# Patient Record
Sex: MALE | Race: WHITE | Employment: OTHER | URBAN - METROPOLITAN AREA
[De-identification: names, ages, dates, MRNs, and addresses within clinical notes are randomized per-mention and may not be internally consistent; named-entity substitution may affect disease eponyms.]

---

## 2017-02-16 PROBLEM — R42 DIZZINESS: Status: ACTIVE | Noted: 2017-02-16

## 2018-07-10 ENCOUNTER — TELEPHONE (OUTPATIENT)
Dept: NON INVASIVE DIAGNOSTICS | Age: 78
End: 2018-07-10

## 2018-07-18 NOTE — PROGRESS NOTES
Cardiac Electrophysiology Progress Note    Aba Oro  1940  Date of Service: 7/18/18   PCP: Cynthia Joshi MD  Electrophysiologist: Kendra Esposito DO    Reason for Consultation: Recurrent dizziness    SUBJECTIVE: Aba Oro is a [de-identified] y.o., male who I am seeing today for recurrent dizziness. Since his last visit Mr. Maverick Silver had a TTE that showed a EF of 60% and a HM that showed predominately NSR w/ 1 PVC. He states he feels ***. He currently denies any chest pain, palpitations, SOB, orthopnea or PND.     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    Focal seizures ([address] 09/19/2013    Tremors of nervous system 09/16/2013       Past Medical History:   Diagnosis Date    Cataract     both eyes; corrective surgery    CHF (congestive heart failure) ([address]     Depression     Detached retina     x 3    Dizziness     Drusen, retina 2005    Both eyes, right x 2, left once    Glaucoma     Gout     Hearing loss of both ears     Lung cancer ([address] 1/29/10     right upper lung; neck    Macular degeneration     Macular hole 8/2014    left    Memory loss     Osteoarthritis     Prostate enlargement     [facility])     Thyroid nodule     removed    Tremors of nervous system     Dr. Eleuterio Haddad       Past Surgical History:   Procedure Laterality Date    CARDIAC CATHETERIZATION  1987,     CATARACT REMOVAL WITH IMPLANT Bilateral 2003    COLONOSCOPY      EYE SURGERY  2006    KNEE ARTHROSCOPY Right 1959    KNEE SURGERY Left 1997    [facility]    [facility]    TONSILLECTOMY  1941    VASECTOMY         Family History   Problem Relation Age of Onset    Heart Disease Mother     Heart Attack Mother        Social History   Substance Use Topics    Smoking status: Former Smoker     Years: 47.00     Types: Cigarettes     Quit date: 8/27/2007    Smokeless tobacco: Never Used   [facility] Alcohol use No       Current Outpatient Prescriptions   Medication Sig Dispense Refill    levothyroxine (SYNTHROID) 75 MCG tablet Take 75 mcg by mouth      ferrous gluconate (FERGON) 240 (27 Fe) MG tablet Take 240 mg by mouth daily       vitamin C (ASCORBIC ACID) 500 MG tablet Take 500 mg by mouth daily      Cholecalciferol (VITAMIN D3) 2000 units CAPS Take by mouth daily      docusate sodium (RA COL-RITE) 100 MG capsule Take 100 mg by mouth daily      vitamin B-12 (CYANOCOBALAMIN) 100 MCG tablet Take 500 mcg by mouth daily      Multiple Vitamins-Minerals (THERAPEUTIC MULTIVITAMIN-MINERALS) tablet Take 1 tablet by mouth daily. Last dose 8/26/2014       No current facility-administered medications for this visit. No Known Allergies    ROS:   Constitutional: Negative for fever, activity change and appetite change. HENT: Negative for epistaxis, difficulty swallowing. Eyes: Negative for blurred vision or double vision. Respiratory: Negative for cough, chest tightness, shortness of breath and wheezing. Cardiovascular: Negative for chest pain, palpitations and leg swelling. Gastrointestinal: Negative for abdominal pain, heartburn, or blood in stool. Genitourinary: Negative for hematuria, burning or frequency. Musculoskeletal: Negative for myalgias, stiffness, or swelling. Skin: Negative for rash, pain, or lumps. Neurological: + recurrent dizziness, no overt syncope. Psychiatric/Behavioral: Negative for confusion and agitation. The patient is not nervous/anxious. PHYSICAL EXAM:  There were no vitals filed for this visit. Constitutional: Oriented to person, place, and time. Well-developed and cooperative. Head: Normocephalic and atraumatic. Eyes: Conjunctivae are normal. Pupils are equal, round, and reactive to light. Neck: No hepatojugular reflux and no JVD present. Carotid bruit is not present. Cardiovascular: S1 normal, S2 normal and intact distal pulses.  A regular rhythm    RME   Number      Account #           [de-identified]      Procedure Date       11/14/2017      Corporate ID                        Ordering Physician   Santiago Mccarthy DO      Accession Number    [id number]       Referring Physician Cynthia Joshi      Date of Birth       1940      Sonographer          Brayan Avila MARLON      Age                 76 year(s)      Interpreting         Valentin Arrington MD                                       Physician                                          Any Other     Procedure    Type of Study      TTE procedure:Echo Complete W/ Dop & Color Flow.     Procedure Date  Date: 11/14/2017 Start: 11:01 AM    Study Location: Echo Lab  Technical Quality: Poor visualization due to lung interference. Indications:Dizziness. Patient Status: Routine    Height: 73 inches Weight: 218 pounds BSA: 2.23 m^2 BMI: 28.76 kg/m^2    HR: 54 bpm     Findings      Left Ventricle   Left ventricular size is grossly normal.   Normal LV segmental wall motion.   Ejection fraction is visually estimated at 60%.   Indeterminate diastolic function.      Right Ventricle   Normal right ventricular size and function.      Left Atrium   Normal sized left atrium.      Right Atrium   Normal right atrium size.      Mitral Valve   No evidence of mitral valve stenosis.      Tricuspid Valve   Mild tricuspid regurgitation. RVSP is 16 mmHg.      Aortic Valve   Mild aortic stenosis is present.      Pulmonic Valve   Not well visualized.  Normal Doppler evaluation.      Pericardial Effusion  Lindalee Melanie is a trivial pericardial effusion noted.      Aorta   Aortic root dimension within normal limits.   Miscellaneous   Inferior Vena Cava not well visualized.      Conclusions      Summary   Technically difficult examination.   Left ventricular size is grossly normal.   Normal LV segmental wall motion.   Ejection fraction is visually estimated at

## 2018-07-19 ENCOUNTER — OFFICE VISIT (OUTPATIENT)
Dept: NON INVASIVE DIAGNOSTICS | Age: 78
End: 2018-07-19
Payer: MEDICARE

## 2018-07-19 VITALS
HEART RATE: 63 BPM | SYSTOLIC BLOOD PRESSURE: 110 MMHG | RESPIRATION RATE: 18 BRPM | BODY MASS INDEX: 30.09 KG/M2 | HEIGHT: 73 IN | WEIGHT: 227 LBS | DIASTOLIC BLOOD PRESSURE: 58 MMHG

## 2018-07-19 DIAGNOSIS — I95.1 ORTHOSTATIC HYPOTENSION: Primary | ICD-10-CM

## 2018-07-19 DIAGNOSIS — R42 DIZZINESS: ICD-10-CM

## 2018-07-19 PROCEDURE — 93000 ELECTROCARDIOGRAM COMPLETE: CPT | Performed by: INTERNAL MEDICINE

## 2018-07-19 PROCEDURE — 99214 OFFICE O/P EST MOD 30 MIN: CPT | Performed by: INTERNAL MEDICINE

## 2018-07-19 NOTE — PROGRESS NOTES
Cardiac Electrophysiology Office Progress Note    Kady Medina  1940  Date of Service: 7/19/18  PCP: Akbar Kim MD  Electrophysiologist: Rose Mary Fang DO    SUBJECTIVE: Kady Medina is a [de-identified] yo male who I was asked to see in Cardiac Electrophysiology follow-up for recurrent dizziness. Mr. Freddie Cuellar is known to me from a tilt table test that I performed in February 2017 for the same symptoms. The tilt table test was normal.  He also was evaluation with a cardiac event monitor which was unremarkable. He has the past medical history as noted below. He states that he continues to have recurrent episodes of dizziness in any position. Now, however, more with change in position. He denies any overt syncope. He denies any associated symptoms of nausea, vomiting or diaphoresis. He also denies any palpitations preceding the event. He currently denies any chest pain, SOB, orthopnea or PND. He does admit to not hydrating well and he continues to drink coffee and Peps[employer] on a regular basis. His orthostatic BP's are positive today.     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    Focal seizures ([address] 09/19/2013    Tremors of nervous system 09/16/2013       Past Medical History:   Diagnosis Date    Cataract     both eyes; corrective surgery    CHF (congestive heart failure) ([address]     Depression     Detached retina     x 3    Dizziness     Drusen, retina 2005    Both eyes, right x 2, left once    Glaucoma     Gout     Hearing loss of both ears     Lung cancer ([address] 1/29/10     right upper lung; neck    Macular degeneration     Macular hole 8/2014    left    Memory loss     Osteoarthritis     Prostate enlargement     Skin cancer 07/17/2018    left temple area    Stroke [facility])     Thyroid nodule     removed    Tremors of nervous system     Dr. Pily Bernal       Past Surgical History: Procedure Laterality Date    CARDIAC CATHETERIZATION  1987,     CATARACT REMOVAL WITH IMPLANT Bilateral 2003    COLONOSCOPY      EYE SURGERY  2006    KNEE ARTHROSCOPY Right 1959    KNEE SURGERY Left 1997    [facility]10    [facility]    SKIN CANCER EXCISION Left     left temple area- skin cancer removal    TONSILLECTOMY  1941    TOOTH EXTRACTION      teeth pulled    VASECTOMY         Family History   Problem Relation Age of Onset    Heart Disease Mother     Heart Attack Mother        Social History   Substance Use Topics    Smoking status: Former Smoker     Years: 47.00     Types: Cigarettes     Quit date: 8/27/2007    Smokeless tobacco: Never Used    Alcohol use No       Current Outpatient Prescriptions   Medication Sig Dispense Refill    levothyroxine (SYNTHROID) 75 MCG tablet Take 75 mcg by mouth      ferrous gluconate (FERGON) 240 (27 Fe) MG tablet Take 240 mg by mouth daily       vitamin C (ASCORBIC ACID) 500 MG tablet Take 500 mg by mouth daily      Cholecalciferol (VITAMIN D3) 2000 units CAPS Take by mouth daily      docusate sodium (RA COL-RITE) 100 MG capsule Take 100 mg by mouth as needed       vitamin B-12 (CYANOCOBALAMIN) 100 MCG tablet Take 500 mcg by mouth daily      Multiple Vitamins-Minerals (THERAPEUTIC MULTIVITAMIN-MINERALS) tablet Take 1 tablet by mouth daily. Last dose 8/26/2014       No current facility-administered medications for this visit. No Known Allergies    ROS:   Constitutional: Negative for fever, activity change and appetite change. HENT: Negative for epistaxis, difficulty swallowing. Eyes: Negative for blurred vision or double vision. Respiratory: Negative for cough, chest tightness, shortness of breath and wheezing. Cardiovascular: Negative for chest pain, palpitations and leg swelling. Gastrointestinal: Negative for abdominal pain, heartburn, or blood in stool. Genitourinary: Negative for hematuria, burning or frequency. Musculoskeletal: Negative for myalgias, stiffness, or swelling. Skin: Negative for rash, pain, or lumps. Neurological: + recurrent dizziness, no overt syncope. Psychiatric/Behavioral: Negative for confusion and agitation. The patient is not nervous/anxious. PHYSICAL EXAM:  Vitals:    07/19/18 0942 07/19/18 0946 07/19/18 0948   BP: (!) 140/70 108/60 (!) 110/58   Site: Right Arm Right Arm Right Arm   Position: Supine Sitting Standing   Cuff Size: Large Adult Large Adult Large Adult   Pulse: 63     Resp: 18     Weight: 227 lb (103 kg)     Height: 6' 1\" (1.854 m)       Constitutional: Oriented to person, place, and time. Well-developed and cooperative. Head: Normocephalic and atraumatic. Eyes: Conjunctivae are normal. Pupils are equal, round, and reactive to light. Neck: No hepatojugular reflux and no JVD present. Carotid bruit is not present. Cardiovascular: S1 normal, S2 normal and intact distal pulses. A regular rhythm present. PMI is not displaced. Pulmonary/Chest: Effort normal and breath sounds normal. No respiratory distress. Abdominal: Soft. Normal appearance and bowel sounds are normal.  No abdominal bruit and no pulsatile midline mass. There is no hepatomegaly. No tenderness. Musculoskeletal: Normal range of motion of all extremities, no muscle weakness. Neurological: Alert and oriented to person, place, and time. Skin: Skin is warm and dry. No bruising, no ecchymosis and no rash noted. Extremity: No clubbing or cyanosis. No edema. Psychiatric: Normal mood and affect. Thought content normal.     Pertinent Labs:  CBC: No results for input(s): WBC, HGB, HCT, PLT in the last 72 hours. BMP:No results for input(s): NA, K, CL, CO2, BUN, CREATININE, CALCIUM in the last 72 hours. Invalid input(s): GLU  ABGs: No results found for: PHART, PO2ART, OFD6UEK  INR: No results for input(s): INR in the last 72 hours. BNP: No results for input(s): BNP in the last 72 hours.    TSH:   Lab Results   Component Value Date    TSH 1.440 05/16/2016      Cardiac Injury Profile: No results for input(s): CKTOTAL, CKMB, CKMBINDEX, TROPONINI in the last 72 hours. Lipid Profile:   Lab Results   Component Value Date    TRIG 52 05/16/2016    HDL 38 05/16/2016    LDLCALC 57 05/16/2016    CHOL 105 05/16/2016      Hemoglobin A1C: No components found for: HGBA1C   Xray:   No orders to display     ECG 7/19/18: Sinus rhythm with a 1st degree AVD, LAFB, PRWP, No acute ST changes    I have independently reviewed all of the ECGs as per above. I have reviewed all progress notes & records from the time of the patient's last office visit up to present. Impression:    1. Dizziness/Orthostatic hypotension  - normal TTT--February 2017  - no overt syncope  - inadequate hydration with caffeine use. 2. H/O vertebrobasilar TIA    3. 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 Hypothyroidism  - Synthroid replacement  Lab Results   Component Value Date    TSH 1.440 05/16/2016    T4FREE 1.62 05/16/2016     Recommendations:    1. Mr. Raphael Monsalve presents with orthostatic hypotension(OH) with associated dizziness with change in position. He does not hydrate well through the day and continues to drink caffeine on a daily basis. I have recommended:  A) Increase hydration: Drink 1-2L of non-caffeinated beverage per day B) Limit caffeine use C) Use support/compression socks during daytime hours. If these recommendations do not help improve his symptoms and he remains orthostatic, consideration can be given to the addition of low dose florinef or midodrine. If one of these medications are used, we would need to follow his baseline BP closely. 2. No changes were made in his medications today.   3. Follow-up in the office in 3 months. I have spent a total of 25 minutes with the patient reviewing the above stated recommendations. A total of >50% of that time involved face-to-face time providing counseling and or coordination of care with the other providers. Thank you for allowing me to participate in your patient's care.     Rose Mary Fang DO  [facility] Cardiac Electrophysiology  [address] [facility]

## 2018-07-19 NOTE — PATIENT INSTRUCTIONS
Orthostatic Hypotension    1. Increase hydration: Drink up to 1-2 L of noncaffeinated beverages per day. 2. Limit caffeine use. 3. Wear compression socks during daytime hours. 4. Office follow-up in 2-3 months. 5. If despite the above, he continues to have continued dizziness and orthostatic hypotension, we will consider the addition of a medication.

## 2018-10-12 ENCOUNTER — TELEPHONE (OUTPATIENT)
Dept: NON INVASIVE DIAGNOSTICS | Age: 78
End: 2018-10-12

## 2018-10-12 NOTE — TELEPHONE ENCOUNTER
Pt calls to state he had cancelled his appt 21 [phone]. He said Dr Michael Silva told him he did not need to f/u. I told him the note was marked to f/u in 3 mo.   I asked him if he would like to make another appt, he declined

## 2019-09-10 ENCOUNTER — OFFICE VISIT (OUTPATIENT)
Dept: CHIROPRACTIC MEDICINE | Age: 79
End: 2019-09-10
Payer: MEDICARE

## 2019-09-10 VITALS — TEMPERATURE: 97 F | HEART RATE: 61 BPM | OXYGEN SATURATION: 94 %

## 2019-09-10 DIAGNOSIS — M99.03 SEGMENTAL AND SOMATIC DYSFUNCTION OF LUMBAR REGION: ICD-10-CM

## 2019-09-10 DIAGNOSIS — M99.05 SEGMENTAL AND SOMATIC DYSFUNCTION OF PELVIC REGION: Primary | ICD-10-CM

## 2019-09-10 DIAGNOSIS — M54.50 ACUTE LEFT-SIDED LOW BACK PAIN WITHOUT SCIATICA: ICD-10-CM

## 2019-09-10 DIAGNOSIS — M53.3 SACROCOCCYGEAL DISORDERS, NOT ELSEWHERE CLASSIFIED: ICD-10-CM

## 2019-09-10 PROCEDURE — 99203 OFFICE O/P NEW LOW 30 MIN: CPT | Performed by: CHIROPRACTOR

## 2019-09-10 PROCEDURE — 98940 CHIROPRACT MANJ 1-2 REGIONS: CPT | Performed by: CHIROPRACTOR

## 2019-09-10 ASSESSMENT — ENCOUNTER SYMPTOMS: BACK PAIN: 1

## 2019-09-16 ENCOUNTER — OFFICE VISIT (OUTPATIENT)
Dept: CHIROPRACTIC MEDICINE | Age: 79
End: 2019-09-16
Payer: MEDICARE

## 2019-09-16 DIAGNOSIS — M54.50 ACUTE LEFT-SIDED LOW BACK PAIN WITHOUT SCIATICA: ICD-10-CM

## 2019-09-16 DIAGNOSIS — M99.05 SEGMENTAL AND SOMATIC DYSFUNCTION OF PELVIC REGION: Primary | ICD-10-CM

## 2019-09-16 DIAGNOSIS — M53.3 SACROCOCCYGEAL DISORDERS, NOT ELSEWHERE CLASSIFIED: ICD-10-CM

## 2019-09-16 DIAGNOSIS — M99.03 SEGMENTAL AND SOMATIC DYSFUNCTION OF LUMBAR REGION: ICD-10-CM

## 2019-09-16 PROCEDURE — 98940 CHIROPRACT MANJ 1-2 REGIONS: CPT | Performed by: CHIROPRACTOR

## 2019-09-16 NOTE — PROGRESS NOTES
19  Sara Ortega : 1940 Sex: male  Age: 78 y.o. Chief Complaint   Patient presents with    Lower Back Pain       HPI:   Pain is has slightly improved. On average, pain is perceived as mild (1-3  pain scale). Change in quality of symptoms: no.  Associated symptoms: none. He denies any other symptoms. Current Outpatient Medications:     levothyroxine (SYNTHROID) 75 MCG tablet, Take 75 mcg by mouth, Disp: , Rfl:     ferrous gluconate (FERGON) 240 (27 Fe) MG tablet, Take 240 mg by mouth daily , Disp: , Rfl:     vitamin C (ASCORBIC ACID) 500 MG tablet, Take 500 mg by mouth daily, Disp: , Rfl:     Cholecalciferol (VITAMIN D3) 2000 units CAPS, Take by mouth daily, Disp: , Rfl:     docusate sodium (RA COL-RITE) 100 MG capsule, Take 100 mg by mouth as needed , Disp: , Rfl:     vitamin B-12 (CYANOCOBALAMIN) 100 MCG tablet, Take 500 mcg by mouth daily, Disp: , Rfl:     Multiple Vitamins-Minerals (THERAPEUTIC MULTIVITAMIN-MINERALS) tablet, Take 1 tablet by mouth daily. Last dose 2014, Disp: , Rfl:     Exam: Tender left SI joint    There are hypertonic and tender fibers noted today in the lateral lumbar paraspinal muscles. Joint fixation is noted with motion screening at L5-S1, bilateral SI joints. Elvin was seen today for lower back pain. Diagnoses and all orders for this visit:    Segmental and somatic dysfunction of pelvic region    Sacrococcygeal disorders, not elsewhere classified    Segmental and somatic dysfunction of lumbar region    Acute left-sided low back pain without sciatica        Treatment Plan: Continued with mechanically assisted manipulation. Waite flexion distraction manipulation as well at L5, protocol 1. Tolerated well.   Follow-up 1 week      Seen By:  Cuco Marie DC

## 2020-01-01 ENCOUNTER — TELEPHONE (OUTPATIENT)
Dept: ADMINISTRATIVE | Age: 80
End: 2020-01-01

## 2020-01-01 LAB
ALBUMIN SERPL-MCNC: NORMAL G/DL
ALP BLD-CCNC: NORMAL U/L
ALT SERPL-CCNC: NORMAL U/L
ANION GAP SERPL CALCULATED.3IONS-SCNC: NORMAL MMOL/L
AST SERPL-CCNC: NORMAL U/L
BILIRUB SERPL-MCNC: NORMAL MG/DL
BUN BLDV-MCNC: NORMAL MG/DL
CALCIUM SERPL-MCNC: NORMAL MG/DL
CHLORIDE BLD-SCNC: NORMAL MMOL/L
CO2: NORMAL
CREAT SERPL-MCNC: NORMAL MG/DL
GFR CALCULATED: NORMAL
GLUCOSE BLD-MCNC: NORMAL MG/DL
HCT VFR BLD CALC: NORMAL %
HEMOGLOBIN: NORMAL
PLATELET # BLD: NORMAL 10*3/UL
POTASSIUM SERPL-SCNC: NORMAL MMOL/L
SODIUM BLD-SCNC: NORMAL MMOL/L
TOTAL PROTEIN: NORMAL
TSH SERPL DL<=0.05 MIU/L-ACNC: NORMAL M[IU]/L
WBC # BLD: NORMAL 10*3/UL

## 2020-07-17 ENCOUNTER — TELEPHONE (OUTPATIENT)
Dept: NON INVASIVE DIAGNOSTICS | Age: 80
End: 2020-07-17

## 2020-07-17 NOTE — TELEPHONE ENCOUNTER
Patient declined virtual video visit and requested a virtual phone call visit. Patient scheduled with [facility] on 08/05/2020. We want to confirm that, for purposes of billing, this is a virtual visit with your provider for which we will submit a claim for reimbursement with your insurance company. You will be responsible for any copays, coinsurance amounts or other amounts not covered by your insurance company. If you do not accept this, unfortunately we will not be able to schedule a virtual visit with the provider. Do you accept? Patient accepted and verbalized understanding.

## 2020-08-06 ENCOUNTER — VIRTUAL VISIT (OUTPATIENT)
Dept: NON INVASIVE DIAGNOSTICS | Age: 80
End: 2020-08-06
Payer: MEDICARE

## 2020-08-06 PROCEDURE — 99443 PR PHYS/QHP TELEPHONE EVALUATION 21-30 MIN: CPT | Performed by: INTERNAL MEDICINE

## 2020-08-06 NOTE — PROGRESS NOTES
[address] [facility]  1940  Date of Service: 8/6/20  PCP: Chata Crowe MD  Electrophysiologist: Evens Higgins DO    7/19/18:  Uriel Elias is a [de-identified] yo male who I was asked to see in Cardiac Electrophysiology follow-up for recurrent dizziness. Mr. Nadine Belle is known to me from a tilt table test that I performed in February 2017 for the same symptoms. The tilt table test was normal.  He also was evaluation with a cardiac event monitor which was unremarkable. He has the past medical history as noted below. He states that he continues to have recurrent episodes of dizziness in any position. Now, however, more with change in position. He denies any overt syncope. He denies any associated symptoms of nausea, vomiting or diaphoresis. He also denies any palpitations preceding the event. He currently denies any chest pain, SOB, orthopnea or PND. He does admit to not hydrating well and he continues to drink coffee and Pepsi on a regular basis. His orthostatic BP's are positive today. 08/06/20--TV: Uriel Elias gives verbal consent for a telephone visit from his home via my office. Since his last OV in 7/2018 he offers no complaint from a cardiac or EP perspective. He denies any near syncope or syncope.     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    Focal seizures ([address] 09/19/2013    Tremors of nervous system 09/16/2013       Past Medical History:   Diagnosis Date    Cataract     both eyes; corrective surgery    CHF (congestive heart failure) (HCC)     Depression     Detached retina     x 3    Dizziness     Drusen, retina 2005    Both eyes, right x 2, left once    Glaucoma     Gout     Hearing loss of both ears     Lung cancer ([address] 1/29/10     right upper lung; neck    Macular degeneration     Macular hole 2014    left    Memory loss     Osteoarthritis     Prostate enlargement     Skin cancer 2018    left temple area    Stroke [facility])     Thyroid nodule     removed    Tremors of nervous system     Dr. Stephanie Matthews       Past Surgical History:   Procedure Laterality Date   [address],     CATARACT REMOVAL WITH IMPLANT Bilateral     COLONOSCOPY      EYE SURGERY  2006    KNEE ARTHROSCOPY Right     KNEE SURGERY Left     [facility]    [facility]    SKIN CANCER EXCISION Left     left temple area- skin cancer removal    TONSILLECTOMY  194    TOOTH EXTRACTION      teeth pulled    VASECTOMY         Family History   Problem Relation Age of Onset    Heart Disease Mother     Heart Attack Mother        Social History     Tobacco Use    Smoking status: Former Smoker     Years: 47.00     Types: Cigarettes     Last attempt to quit: 2007     Years since quittin.9    Smokeless tobacco: Never Used   Substance Use Topics    Alcohol use: No       Current Outpatient Medications   Medication Sig Dispense Refill    levothyroxine (SYNTHROID) 75 MCG tablet Take 75 mcg by mouth      ferrous gluconate (FERGON) 240 (27 Fe) MG tablet Take 240 mg by mouth daily       vitamin C (ASCORBIC ACID) 500 MG tablet Take 500 mg by mouth daily      Cholecalciferol (VITAMIN D3) 2000 units CAPS Take by mouth daily      docusate sodium (RA COL-RITE) 100 MG capsule Take 100 mg by mouth as needed       vitamin B-12 (CYANOCOBALAMIN) 100 MCG tablet Take 500 mcg by mouth daily      Multiple Vitamins-Minerals (THERAPEUTIC MULTIVITAMIN-MINERALS) tablet Take 1 tablet by mouth daily. Last dose 2014       No current facility-administered medications for this visit. No Known Allergies    ROS:   Constitutional: Negative for fever, activity change and appetite change. HENT: Negative for epistaxis, difficulty swallowing.    Eyes: Negative for blurred vision or double vision. Respiratory: Negative for cough, chest tightness, shortness of breath and wheezing. Cardiovascular: Negative for chest pain, palpitations and leg swelling. Gastrointestinal: Negative for abdominal pain, heartburn, or blood in stool. Genitourinary: Negative for hematuria, burning or frequency. Musculoskeletal: Negative for myalgias, stiffness, or swelling. Skin: Negative for rash, pain, or lumps. Psychiatric/Behavioral: Negative for confusion and agitation. The patient is not nervous/anxious. PHYSICAL EXAM:  Constitutional: Oriented to person, place, and time. Psychiatric: Normal mood and affect. Thought content normal.     Pertinent Labs:  CBC: No results for input(s): WBC, HGB, HCT, PLT in the last 72 hours. BMP:No results for input(s): NA, K, CL, CO2, BUN, CREATININE, CALCIUM in the last 72 hours. Invalid input(s): GLU  ABGs: No results found for: PHART, PO2ART, MYL5WPT  INR: No results for input(s): INR in the last 72 hours. BNP: No results for input(s): BNP in the last 72 hours. TSH:   Lab Results   Component Value Date    TSH 1.440 05/16/2016      Cardiac Injury Profile: No results for input(s): CKTOTAL, CKMB, CKMBINDEX, TROPONINI in the last 72 hours. Lipid Profile:   Lab Results   Component Value Date    TRIG 52 05/16/2016    HDL 38 05/16/2016    LDLCALC 57 05/16/2016    CHOL 105 05/16/2016      Hemoglobin A1C: No components found for: HGBA1C   Xray:   No orders to display     ECG 7/19/18: Sinus rhythm with a 1st degree AVD, LAFB, PRWP, No acute ST changes    I have independently reviewed all of the ECGs as per above. I have reviewed all progress notes & records from the time of the patient's last office visit up to present. Impression:    1. Dizziness/Orthostatic hypotension  - normal TTT--February 2017  - no overt syncope  - inadequate hydration with caffeine use. 2. H/O vertebrobasilar TIA    3.  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 Hypothyroidism  - Synthroid replacement  Lab Results   Component Value Date    TSH 1.440 05/16/2016    T4FREE 1.62 05/16/2016     Recommendations:    1. No changers were made in his medications. 2. Office follow-up as needed. I have spent a total of 25 minutes with the patient via a telephone visit reviewing the above stated recommendations. A total of >50% of that time involved in providing counseling and or coordination of care with the other providers. Thank you for allowing me to participate in your patient's care. Michel Toro DO  [facility] Cardiac Electrophysiology  [facility]    Due to this being a TeleHealth encounter, evaluation of organ systems is limited. Pursuant to the emergency declaration under the [facility], [facility] waiver authority and the [employer] General Act, this telephone visit was conducted, with patient's consent, to reduce the patient's risk of exposure to COVID-19 and provide continuity of care for an established patient. Services were provided through a telephone discussion to substitute for in-person clinic visit.

## 2020-12-01 NOTE — TELEPHONE ENCOUNTER
Pt's son calling at the request of his PCP Dr. Patsy Guaman - to schedule an OV with Dr. Zoë Murray - pt is c/o of dizziness for months now. Please return pt call on his home number re; apt/recommendations.

## 2021-01-01 ENCOUNTER — NURSE ONLY (OUTPATIENT)
Dept: FAMILY MEDICINE CLINIC | Age: 81
End: 2021-01-01
Payer: MEDICARE

## 2021-01-01 ENCOUNTER — OUTSIDE SERVICES (OUTPATIENT)
Dept: FAMILY MEDICINE CLINIC | Age: 81
End: 2021-01-01
Payer: MEDICARE

## 2021-01-01 ENCOUNTER — TELEPHONE (OUTPATIENT)
Dept: ADMINISTRATIVE | Age: 81
End: 2021-01-01

## 2021-01-01 VITALS
RESPIRATION RATE: 16 BRPM | HEART RATE: 57 BPM | SYSTOLIC BLOOD PRESSURE: 100 MMHG | TEMPERATURE: 95.1 F | DIASTOLIC BLOOD PRESSURE: 60 MMHG | OXYGEN SATURATION: 95 %

## 2021-01-01 VITALS
DIASTOLIC BLOOD PRESSURE: 60 MMHG | HEART RATE: 60 BPM | OXYGEN SATURATION: 96 % | TEMPERATURE: 95.8 F | SYSTOLIC BLOOD PRESSURE: 90 MMHG | RESPIRATION RATE: 16 BRPM

## 2021-01-01 VITALS
RESPIRATION RATE: 16 BRPM | TEMPERATURE: 95.5 F | OXYGEN SATURATION: 91 % | DIASTOLIC BLOOD PRESSURE: 70 MMHG | HEART RATE: 53 BPM | SYSTOLIC BLOOD PRESSURE: 130 MMHG

## 2021-01-01 VITALS — TEMPERATURE: 96.8 F

## 2021-01-01 DIAGNOSIS — Z85.118 HISTORY OF LUNG CANCER: ICD-10-CM

## 2021-01-01 DIAGNOSIS — R53.83 FATIGUE, UNSPECIFIED TYPE: ICD-10-CM

## 2021-01-01 DIAGNOSIS — E63.9 POOR NUTRITION: ICD-10-CM

## 2021-01-01 DIAGNOSIS — Z91.81 HISTORY OF FALL: ICD-10-CM

## 2021-01-01 DIAGNOSIS — R56.9 FOCAL SEIZURES (HCC): ICD-10-CM

## 2021-01-01 DIAGNOSIS — Z97.8 USES FEEDING TUBE: ICD-10-CM

## 2021-01-01 DIAGNOSIS — R42 DIZZINESS: ICD-10-CM

## 2021-01-01 DIAGNOSIS — D64.9 ANEMIA, UNSPECIFIED TYPE: ICD-10-CM

## 2021-01-01 DIAGNOSIS — I67.9 CEREBROVASCULAR DISEASE: ICD-10-CM

## 2021-01-01 DIAGNOSIS — I67.2 CEREBRAL ATHEROSCLEROSIS: ICD-10-CM

## 2021-01-01 DIAGNOSIS — I67.2 CEREBRAL ATHEROSCLEROSIS: Primary | ICD-10-CM

## 2021-01-01 DIAGNOSIS — E55.9 VITAMIN D DEFICIENCY: ICD-10-CM

## 2021-01-01 DIAGNOSIS — G45.0 VERTEBROBASILAR TIAS: ICD-10-CM

## 2021-01-01 DIAGNOSIS — I95.9 HYPOTENSION, UNSPECIFIED HYPOTENSION TYPE: Primary | ICD-10-CM

## 2021-01-01 DIAGNOSIS — Z12.5 SCREENING FOR PROSTATE CANCER: ICD-10-CM

## 2021-01-01 DIAGNOSIS — Z00.8 ENCOUNTER FOR OTHER GENERAL EXAMINATION: Primary | ICD-10-CM

## 2021-01-01 DIAGNOSIS — R13.10 DYSPHAGIA, UNSPECIFIED TYPE: Primary | ICD-10-CM

## 2021-01-01 DIAGNOSIS — I95.9 HYPOTENSION, UNSPECIFIED HYPOTENSION TYPE: ICD-10-CM

## 2021-01-01 DIAGNOSIS — R25.1 TREMORS OF NERVOUS SYSTEM: ICD-10-CM

## 2021-01-01 LAB
ALBUMIN SERPL-MCNC: 2.7 G/DL (ref 3.5–5.2)
ALBUMIN SERPL-MCNC: 2.7 G/DL (ref 3.5–5.2)
ALBUMIN SERPL-MCNC: 3.7 G/DL (ref 3.5–5.2)
ALP BLD-CCNC: 75 U/L (ref 40–129)
ALP BLD-CCNC: 78 U/L (ref 40–129)
ALP BLD-CCNC: 91 U/L (ref 40–129)
ALT SERPL-CCNC: 7 U/L (ref 0–40)
ALT SERPL-CCNC: 7 U/L (ref 0–40)
ALT SERPL-CCNC: <5 U/L (ref 0–40)
AMMONIA: 29 UMOL/L (ref 16–60)
ANION GAP SERPL CALCULATED.3IONS-SCNC: 10 MMOL/L (ref 7–16)
ANION GAP SERPL CALCULATED.3IONS-SCNC: 10 MMOL/L (ref 7–16)
ANION GAP SERPL CALCULATED.3IONS-SCNC: 14 MMOL/L (ref 7–16)
ANION GAP SERPL CALCULATED.3IONS-SCNC: 14 MMOL/L (ref 7–16)
ANION GAP SERPL CALCULATED.3IONS-SCNC: 3 MMOL/L (ref 7–16)
ANION GAP SERPL CALCULATED.3IONS-SCNC: 5 MMOL/L (ref 7–16)
ANION GAP SERPL CALCULATED.3IONS-SCNC: 6 MMOL/L (ref 7–16)
ANION GAP SERPL CALCULATED.3IONS-SCNC: 7 MMOL/L (ref 7–16)
ANION GAP SERPL CALCULATED.3IONS-SCNC: 7 MMOL/L (ref 7–16)
ANISOCYTOSIS: ABNORMAL
AST SERPL-CCNC: 18 U/L (ref 0–39)
AST SERPL-CCNC: 23 U/L (ref 0–39)
AST SERPL-CCNC: 25 U/L (ref 0–39)
BASOPHILS ABSOLUTE: 0 E9/L (ref 0–0.2)
BASOPHILS ABSOLUTE: 0.04 E9/L (ref 0–0.2)
BASOPHILS ABSOLUTE: 0.05 E9/L (ref 0–0.2)
BASOPHILS ABSOLUTE: 0.05 E9/L (ref 0–0.2)
BASOPHILS RELATIVE PERCENT: 0.4 % (ref 0–2)
BASOPHILS RELATIVE PERCENT: 0.6 % (ref 0–2)
BASOPHILS RELATIVE PERCENT: 0.7 % (ref 0–2)
BASOPHILS RELATIVE PERCENT: 0.7 % (ref 0–2)
BILIRUB SERPL-MCNC: 0.2 MG/DL (ref 0–1.2)
BILIRUB SERPL-MCNC: 0.2 MG/DL (ref 0–1.2)
BILIRUB SERPL-MCNC: 0.3 MG/DL (ref 0–1.2)
BUN BLDV-MCNC: 30 MG/DL (ref 6–23)
BUN BLDV-MCNC: 34 MG/DL (ref 6–23)
BUN BLDV-MCNC: 37 MG/DL (ref 6–23)
BUN BLDV-MCNC: 38 MG/DL (ref 8–23)
BUN BLDV-MCNC: 42 MG/DL (ref 6–23)
BUN BLDV-MCNC: 43 MG/DL (ref 6–23)
BUN BLDV-MCNC: 53 MG/DL (ref 6–23)
BUN BLDV-MCNC: 67 MG/DL (ref 6–23)
BUN BLDV-MCNC: 68 MG/DL (ref 6–23)
CALCIUM SERPL-MCNC: 8.6 MG/DL (ref 8.6–10.2)
CALCIUM SERPL-MCNC: 8.7 MG/DL (ref 8.6–10.2)
CALCIUM SERPL-MCNC: 8.7 MG/DL (ref 8.6–10.2)
CALCIUM SERPL-MCNC: 8.9 MG/DL (ref 8.6–10.2)
CALCIUM SERPL-MCNC: 8.9 MG/DL (ref 8.6–10.2)
CALCIUM SERPL-MCNC: 9 MG/DL (ref 8.6–10.2)
CALCIUM SERPL-MCNC: 9 MG/DL (ref 8.6–10.2)
CALCIUM SERPL-MCNC: 9.1 MG/DL (ref 8.6–10.2)
CALCIUM SERPL-MCNC: 9.5 MG/DL (ref 8.6–10.2)
CHLORIDE BLD-SCNC: 100 MMOL/L (ref 98–107)
CHLORIDE BLD-SCNC: 90 MMOL/L (ref 98–107)
CHLORIDE BLD-SCNC: 92 MMOL/L (ref 98–107)
CHLORIDE BLD-SCNC: 94 MMOL/L (ref 98–107)
CHLORIDE BLD-SCNC: 94 MMOL/L (ref 98–107)
CHLORIDE BLD-SCNC: 95 MMOL/L (ref 98–107)
CHLORIDE BLD-SCNC: 97 MMOL/L (ref 98–107)
CHLORIDE BLD-SCNC: 97 MMOL/L (ref 98–107)
CHLORIDE BLD-SCNC: 98 MMOL/L (ref 98–107)
CHOLESTEROL, TOTAL: 135 MG/DL (ref 0–199)
CO2: 19 MMOL/L (ref 22–29)
CO2: 26 MMOL/L (ref 22–29)
CO2: 29 MMOL/L (ref 22–29)
CO2: 30 MMOL/L (ref 22–29)
CO2: 30 MMOL/L (ref 22–29)
CO2: 34 MMOL/L (ref 22–29)
CO2: 35 MMOL/L (ref 22–29)
CO2: 36 MMOL/L (ref 22–29)
CO2: 36 MMOL/L (ref 22–29)
CREAT SERPL-MCNC: 1 MG/DL (ref 0.7–1.2)
CREAT SERPL-MCNC: 1.1 MG/DL (ref 0.7–1.2)
CREAT SERPL-MCNC: 1.4 MG/DL (ref 0.7–1.2)
CREAT SERPL-MCNC: 1.5 MG/DL (ref 0.7–1.2)
CREAT SERPL-MCNC: 1.5 MG/DL (ref 0.7–1.2)
EOSINOPHILS ABSOLUTE: 0.12 E9/L (ref 0.05–0.5)
EOSINOPHILS ABSOLUTE: 0.23 E9/L (ref 0.05–0.5)
EOSINOPHILS ABSOLUTE: 0.24 E9/L (ref 0.05–0.5)
EOSINOPHILS ABSOLUTE: 0.36 E9/L (ref 0.05–0.5)
EOSINOPHILS RELATIVE PERCENT: 0.9 % (ref 0–6)
EOSINOPHILS RELATIVE PERCENT: 3.3 % (ref 0–6)
EOSINOPHILS RELATIVE PERCENT: 4.2 % (ref 0–6)
EOSINOPHILS RELATIVE PERCENT: 4.5 % (ref 0–6)
FERRITIN: 349 NG/ML
FOLATE: 9.8 NG/ML (ref 4.8–24.2)
GFR AFRICAN AMERICAN: 54
GFR AFRICAN AMERICAN: 54
GFR AFRICAN AMERICAN: 58
GFR AFRICAN AMERICAN: >60
GFR NON-AFRICAN AMERICAN: 45 ML/MIN/1.73
GFR NON-AFRICAN AMERICAN: 45 ML/MIN/1.73
GFR NON-AFRICAN AMERICAN: 48 ML/MIN/1.73
GFR NON-AFRICAN AMERICAN: >60 ML/MIN/1.73
GLUCOSE BLD-MCNC: 102 MG/DL (ref 74–99)
GLUCOSE BLD-MCNC: 103 MG/DL (ref 74–99)
GLUCOSE BLD-MCNC: 107 MG/DL (ref 74–99)
GLUCOSE BLD-MCNC: 109 MG/DL (ref 74–99)
GLUCOSE BLD-MCNC: 49 MG/DL (ref 74–99)
GLUCOSE BLD-MCNC: 57 MG/DL (ref 74–99)
GLUCOSE BLD-MCNC: 79 MG/DL (ref 74–99)
GLUCOSE BLD-MCNC: 82 MG/DL (ref 74–99)
GLUCOSE BLD-MCNC: 85 MG/DL (ref 74–99)
HCT VFR BLD CALC: 24.6 % (ref 37–54)
HCT VFR BLD CALC: 25.8 % (ref 37–54)
HCT VFR BLD CALC: 33.9 % (ref 37–54)
HCT VFR BLD CALC: 35 % (ref 37–54)
HCT VFR BLD CALC: 39.1 % (ref 37–54)
HDLC SERPL-MCNC: 53 MG/DL
HEMOGLOBIN: 10.6 G/DL (ref 12.5–16.5)
HEMOGLOBIN: 10.8 G/DL (ref 12.5–16.5)
HEMOGLOBIN: 11.6 G/DL (ref 12.5–16.5)
HEMOGLOBIN: 7.7 G/DL (ref 12.5–16.5)
HEMOGLOBIN: 8.1 G/DL (ref 12.5–16.5)
HYPOCHROMIA: ABNORMAL
IMMATURE GRANULOCYTES #: 0.01 E9/L
IMMATURE GRANULOCYTES #: 0.13 E9/L
IMMATURE GRANULOCYTES #: 0.14 E9/L
IMMATURE GRANULOCYTES %: 0.2 % (ref 0–5)
IMMATURE GRANULOCYTES %: 1.7 % (ref 0–5)
IMMATURE GRANULOCYTES %: 1.8 % (ref 0–5)
IRON SATURATION: 22 % (ref 20–55)
IRON: 50 MCG/DL (ref 59–158)
IRON: 50 MCG/DL (ref 59–158)
LDL CHOLESTEROL CALCULATED: 69 MG/DL (ref 0–99)
LYMPHOCYTES ABSOLUTE: 0.4 E9/L (ref 1.5–4)
LYMPHOCYTES ABSOLUTE: 0.74 E9/L (ref 1.5–4)
LYMPHOCYTES ABSOLUTE: 0.76 E9/L (ref 1.5–4)
LYMPHOCYTES ABSOLUTE: 0.81 E9/L (ref 1.5–4)
LYMPHOCYTES RELATIVE PERCENT: 10.2 % (ref 20–42)
LYMPHOCYTES RELATIVE PERCENT: 14.9 % (ref 20–42)
LYMPHOCYTES RELATIVE PERCENT: 2.6 % (ref 20–42)
LYMPHOCYTES RELATIVE PERCENT: 9.4 % (ref 20–42)
MCH RBC QN AUTO: 30.4 PG (ref 26–35)
MCH RBC QN AUTO: 30.5 PG (ref 26–35)
MCH RBC QN AUTO: 31.2 PG (ref 26–35)
MCH RBC QN AUTO: 31.2 PG (ref 26–35)
MCHC RBC AUTO-ENTMCNC: 29.7 % (ref 32–34.5)
MCHC RBC AUTO-ENTMCNC: 30.9 % (ref 32–34.5)
MCHC RBC AUTO-ENTMCNC: 31.3 % (ref 32–34.5)
MCHC RBC AUTO-ENTMCNC: 31.4 % (ref 32–34.5)
MCV RBC AUTO: 102.9 FL (ref 80–99.9)
MCV RBC AUTO: 98.6 FL (ref 80–99.9)
MCV RBC AUTO: 99.2 FL (ref 80–99.9)
MCV RBC AUTO: 99.7 FL (ref 80–99.9)
MONOCYTES ABSOLUTE: 0.76 E9/L (ref 0.1–0.95)
MONOCYTES ABSOLUTE: 0.83 E9/L (ref 0.1–0.95)
MONOCYTES ABSOLUTE: 0.94 E9/L (ref 0.1–0.95)
MONOCYTES ABSOLUTE: 1.47 E9/L (ref 0.1–0.95)
MONOCYTES RELATIVE PERCENT: 10.3 % (ref 2–12)
MONOCYTES RELATIVE PERCENT: 11.3 % (ref 2–12)
MONOCYTES RELATIVE PERCENT: 13 % (ref 2–12)
MONOCYTES RELATIVE PERCENT: 13.9 % (ref 2–12)
NEUTROPHILS ABSOLUTE: 11.39 E9/L (ref 1.8–7.3)
NEUTROPHILS ABSOLUTE: 3.6 E9/L (ref 1.8–7.3)
NEUTROPHILS ABSOLUTE: 5.14 E9/L (ref 1.8–7.3)
NEUTROPHILS ABSOLUTE: 5.94 E9/L (ref 1.8–7.3)
NEUTROPHILS RELATIVE PERCENT: 66.1 % (ref 43–80)
NEUTROPHILS RELATIVE PERCENT: 71 % (ref 43–80)
NEUTROPHILS RELATIVE PERCENT: 73.5 % (ref 43–80)
NEUTROPHILS RELATIVE PERCENT: 85.2 % (ref 43–80)
PDW BLD-RTO: 13.4 FL (ref 11.5–15)
PDW BLD-RTO: 13.4 FL (ref 11.5–15)
PDW BLD-RTO: 14.9 FL (ref 11.5–15)
PDW BLD-RTO: 15.9 FL (ref 11.5–15)
PLATELET # BLD: 207 E9/L (ref 130–450)
PLATELET # BLD: 209 E9/L (ref 130–450)
PLATELET # BLD: 352 E9/L (ref 130–450)
PLATELET # BLD: 415 E9/L (ref 130–450)
PMV BLD AUTO: 10 FL (ref 7–12)
PMV BLD AUTO: 10.3 FL (ref 7–12)
PMV BLD AUTO: 10.7 FL (ref 7–12)
PMV BLD AUTO: 9.9 FL (ref 7–12)
POIKILOCYTES: ABNORMAL
POLYCHROMASIA: ABNORMAL
POTASSIUM SERPL-SCNC: 4.2 MMOL/L (ref 3.5–5)
POTASSIUM SERPL-SCNC: 4.9 MMOL/L (ref 3.5–5)
POTASSIUM SERPL-SCNC: 4.9 MMOL/L (ref 3.5–5)
POTASSIUM SERPL-SCNC: 5 MMOL/L (ref 3.5–5)
POTASSIUM SERPL-SCNC: 5.1 MMOL/L (ref 3.5–5)
POTASSIUM SERPL-SCNC: 5.2 MMOL/L (ref 3.5–5)
POTASSIUM SERPL-SCNC: 5.7 MMOL/L (ref 3.5–5)
POTASSIUM SERPL-SCNC: 6.2 MMOL/L (ref 3.5–5)
POTASSIUM SERPL-SCNC: 6.9 MMOL/L (ref 3.5–5)
RBC # BLD: 2.6 E12/L (ref 3.8–5.8)
RBC # BLD: 3.4 E12/L (ref 3.8–5.8)
RBC # BLD: 3.55 E12/L (ref 3.8–5.8)
RBC # BLD: 3.8 E12/L (ref 3.8–5.8)
SARS-COV-2: NOT DETECTED
SODIUM BLD-SCNC: 128 MMOL/L (ref 132–146)
SODIUM BLD-SCNC: 132 MMOL/L (ref 132–146)
SODIUM BLD-SCNC: 133 MMOL/L (ref 132–146)
SODIUM BLD-SCNC: 133 MMOL/L (ref 132–146)
SODIUM BLD-SCNC: 134 MMOL/L (ref 132–146)
SODIUM BLD-SCNC: 136 MMOL/L (ref 132–146)
SODIUM BLD-SCNC: 137 MMOL/L (ref 132–146)
SODIUM BLD-SCNC: 137 MMOL/L (ref 132–146)
SODIUM BLD-SCNC: 138 MMOL/L (ref 132–146)
SOURCE: NORMAL
T4 FREE: 0.97 NG/DL (ref 0.93–1.7)
TOTAL IRON BINDING CAPACITY: 231 MCG/DL (ref 250–450)
TOTAL PROTEIN: 6.3 G/DL (ref 6.4–8.3)
TOTAL PROTEIN: 6.3 G/DL (ref 6.4–8.3)
TOTAL PROTEIN: 6.4 G/DL (ref 6.4–8.3)
TRIGL SERPL-MCNC: 63 MG/DL (ref 0–149)
TSH SERPL DL<=0.05 MIU/L-ACNC: 5.3 UIU/ML (ref 0.27–4.2)
TSH SERPL DL<=0.05 MIU/L-ACNC: 9.14 UIU/ML (ref 0.27–4.2)
VITAMIN B-12: 1667 PG/ML (ref 211–946)
VITAMIN D 25-HYDROXY: 26 NG/ML (ref 30–100)
VITAMIN D 25-HYDROXY: 28 NG/ML (ref 30–100)
VLDLC SERPL CALC-MCNC: 13 MG/DL
WBC # BLD: 13.4 E9/L (ref 4.5–11.5)
WBC # BLD: 5.5 E9/L (ref 4.5–11.5)
WBC # BLD: 7.2 E9/L (ref 4.5–11.5)
WBC # BLD: 8.1 E9/L (ref 4.5–11.5)

## 2021-01-01 PROCEDURE — G0439 PPPS, SUBSEQ VISIT: HCPCS | Performed by: CLINICAL NURSE SPECIALIST

## 2021-01-01 PROCEDURE — 36415 COLL VENOUS BLD VENIPUNCTURE: CPT | Performed by: CLINICAL NURSE SPECIALIST

## 2021-01-01 RX ORDER — MECLIZINE HCL 12.5 MG/1
12.5 TABLET ORAL 3 TIMES DAILY PRN
COMMUNITY

## 2021-01-01 RX ORDER — MIDODRINE HYDROCHLORIDE 5 MG/1
10 TABLET ORAL 3 TIMES DAILY
COMMUNITY

## 2021-01-01 ASSESSMENT — ENCOUNTER SYMPTOMS
EYES NEGATIVE: 1
ALLERGIC/IMMUNOLOGIC NEGATIVE: 1
RESPIRATORY NEGATIVE: 1
EYES NEGATIVE: 1
RESPIRATORY NEGATIVE: 1
ALLERGIC/IMMUNOLOGIC NEGATIVE: 1
GASTROINTESTINAL NEGATIVE: 1
GASTROINTESTINAL NEGATIVE: 1

## 2021-01-01 ASSESSMENT — PATIENT HEALTH QUESTIONNAIRE - PHQ9
SUM OF ALL RESPONSES TO PHQ QUESTIONS 1-9: 0
1. LITTLE INTEREST OR PLEASURE IN DOING THINGS: 0
SUM OF ALL RESPONSES TO PHQ9 QUESTIONS 1 & 2: 0
SUM OF ALL RESPONSES TO PHQ QUESTIONS 1-9: 0

## 2021-01-01 ASSESSMENT — LIFESTYLE VARIABLES: HOW OFTEN DO YOU HAVE A DRINK CONTAINING ALCOHOL: 0

## 2021-03-16 PROBLEM — E63.9 POOR NUTRITION: Status: ACTIVE | Noted: 2021-01-01

## 2021-03-16 PROBLEM — Z85.118 HISTORY OF LUNG CANCER: Status: ACTIVE | Noted: 2021-01-01

## 2021-03-16 NOTE — PROGRESS NOTES
Dr. Anna Dickerson       Past Surgical History:   Procedure Laterality Date   [address],     CATARACT REMOVAL WITH IMPLANT Bilateral 2003    COLONOSCOPY      EYE SURGERY  2006    KNEE ARTHROSCOPY Right 1959    KNEE SURGERY Left 1997    [facility]10    [facility]    SKIN CANCER EXCISION Left     left temple area- skin cancer removal    TONSILLECTOMY  1941    TOOTH EXTRACTION      teeth pulled    VASECTOMY           Family History   Problem Relation Age of Onset    Heart Disease Mother     Heart Attack Mother        CareTeam (Including outside providers/suppliers regularly involved in providing care):   Patient Care Team:  Akash Padilla DO as PCP - General (Family Medicine)  Akash Padilla DO as PCP - [facility] Tyler Fernandez Provider  Ghislaine Rodgers DO as Consulting Physician (Electrophysiology)  Dorcus Dakin, APRN - CNP as Nurse Practitioner (Nurse Practitioner)    Wt Readings from Last 3 Encounters:   07/19/18 227 lb (103 kg)   11/07/17 218 lb 6.4 oz (99.1 kg)   02/16/17 220 lb (99.8 kg)     Vitals:    03/16/21 1330   BP: 130/70   Pulse: 53   Resp: 16   Temp: 95.5 °F (35.3 °C)   SpO2: 91%     There is no height or weight on file to calculate BMI. Based upon direct observation of the patient, evaluation of cognition reveals recent and remote memory intact.     General Appearance: alert and oriented to person, place and time, well developed and well- nourished, in no acute distress  Skin: warm and dry, no rash or erythema  Head: normocephalic and atraumatic  Eyes: pupils equal, round, and reactive to light, extraocular eye movements intact, conjunctivae normal  ENT: tympanic membrane, external ear and ear canal normal bilaterally, nose without deformity, nasal mucosa and turbinates normal without polyps  Neck: supple and non-tender without mass, no thyromegaly or thyroid nodules, no cervical lymphadenopathy  Pulmonary/Chest: clear to auscultation bilaterally-  rales present left posterior fields, no wheezing or rhonci, normal air movement, no respiratory distress  Cardiovascular: normal rate, regular rhythm, normal S1 and S2, no murmurs, rubs, clicks, or gallops, distal pulses intact, no carotid bruits  Abdomen: soft, non-tender, non-distended, normal bowel sounds, no masses or organomegaly  Extremities: no cyanosis, clubbing or edema  Musculoskeletal: normal range of motion, no joint swelling, deformity or tenderness  Neurologic: reflexes normal and symmetric, no cranial nerve deficit, gait, coordination and speech normal    Patient's complete Health Risk Assessment and screening values have been reviewed and are found in Flowsheets. The following problems were reviewed today and where indicated follow up appointments were made and/or referrals ordered. Positive Risk Factor Screenings with Interventions:     Fall Risk:  Timed Up and Go Test > 12 seconds? (Complete if either Fall Risk answers are Yes): no  2 or more falls in past year?: (!) yes  Fall with injury in past year?: (!) yes  Fall Risk Interventions:    · Home safety tips provided         Health Habits/Nutrition:  Health Habits/Nutrition  Do you exercise for at least 20 minutes 2-3 times per week?: Yes  Have you lost any weight without trying in the past 3 months?: (!) Yes  Do you eat only one meal per day?: No  Have you seen the dentist within the past year?: N/A - wear dentures     Health Habits/Nutrition Interventions:  · Patient has feeding tube and receives tube feedings.     Hearing/Vision:  No exam data present  Hearing/Vision  Do you or your family notice any trouble with your hearing that hasn't been managed with hearing aids?: (!) Yes  Do you have difficulty driving, watching TV, or doing any of your daily activities because of your eyesight?: No  Have you had an eye exam within the past year?: Yes  Hearing/Vision Interventions:  · Hearing concerns:  Encouraged patient to follow up with hearing specialist. Safety:  Safety  Do you have working smoke detectors?: Yes  Have all throw rugs been removed or fastened?: Yes  Do you have non-slip mats or surfaces in all bathtubs/showers?: Yes  Do all of your stairways have a railing or banister?: Yes  Are your doorways, halls and stairs free of clutter?: Yes  Do you always fasten your seatbelt when you are in a car?: (!) No  Safety Interventions:  · Encouraged seatbelt use while riding in a car. ADL:  ADLs  In the past 7 days, did you need help from others to perform any of the following everyday activities? Eating, dressing, grooming, bathing, toileting, or walking/balance?: (!) Eating, Bathing  In the past 7 days, did you need help from others to take care of any of the following? Laundry, housekeeping, banking/finances, shopping, telephone use, food preparation, transportation, or taking medications?: [employer], Housekeeping, Banking/Finances, Shopping, Food Preparation, Transportation, Taking Medications  ADL Interventions:  · Patient has visiting nurses that help with tube feedings and bathing. Patient lives in a facility that provides laundry, housekeeping, meals, and transportation. Family helps with banking, shopping and medications. Personalized Preventive Plan   Current Health Maintenance Status    There is no immunization history on file for this patient. Health Maintenance   Topic Date Due    COVID-19 Vaccine (1) Never done    DTaP/Tdap/Td vaccine (1 - Tdap) Never done    Shingles Vaccine (1 of 2) Never done   Mery King Annual Wellness Visit (AWV)  Never done    Flu vaccine (1) 09/01/2020    Pneumococcal 65+ years Vaccine  Completed    Hepatitis A vaccine  Aged Out    Hepatitis B vaccine  Aged Out    Hib vaccine  Aged Out    Meningococcal (ACWY) vaccine  Aged Out     Recommendations for [employer] Due: see orders and patient instructions/AVS.  .   Recommended screening schedule for the next 5-10 years is provided to the patient in written

## 2021-03-16 NOTE — PATIENT INSTRUCTIONS
Personalized Preventive Plan for Robbin Nails - 3/16/2021  Medicare offers a range of preventive health benefits. Some of the tests and screenings are paid in full while other may be subject to a deductible, co-insurance, and/or copay. Some of these benefits include a comprehensive review of your medical history including lifestyle, illnesses that may run in your family, and various assessments and screenings as appropriate. After reviewing your medical record and screening and assessments performed today your provider may have ordered immunizations, labs, imaging, and/or referrals for you. A list of these orders (if applicable) as well as your Preventive Care list are included within your After Visit Summary for your review. Other Preventive Recommendations:    · A preventive eye exam performed by an eye specialist is recommended every 1-2 years to screen for glaucoma; cataracts, macular degeneration, and other eye disorders. · A preventive dental visit is recommended every 6 months. · Try to get at least 150 minutes of exercise per week or 10,000 steps per day on a pedometer . · Order or download the FREE \"Exercise & Physical Activity: Your Everyday Guide\" from [employer]. Call 3-604.148.1034 or search [employer] online. · You need 8562-4779 mg of calcium and 1729-6059 IU of vitamin D per day. It is possible to meet your calcium requirement with diet alone, but a vitamin D supplement is usually necessary to meet this goal.  · When exposed to the sun, use a sunscreen that protects against both UVA and UVB radiation with an SPF of 30 or greater. Reapply every 2 to 3 hours or after sweating, drying off with a towel, or swimming. · Always wear a seat belt when traveling in a car. Always wear a helmet when riding a bicycle or motorcycle.

## 2021-03-18 PROBLEM — I95.9 HYPOTENSION: Status: ACTIVE | Noted: 2021-01-01

## 2021-03-18 PROBLEM — R53.83 FATIGUE: Status: ACTIVE | Noted: 2021-01-01

## 2021-03-18 PROBLEM — E55.9 VITAMIN D DEFICIENCY: Status: ACTIVE | Noted: 2021-01-01

## 2021-03-19 NOTE — PROGRESS NOTES
3/18/21  Herminio Donnelly : 1940 Sex: male  Age 78 y.o. Subjective:  Chief Complaint   Patient presents with    Fatigue    Epistaxis         Patient seen today at the [facility] clinic for a chief complaint of fatigue and nosebleeds. Patient stated that he uses oxygen and has had problems with small amounts of nasal bleeding. Patient stated that he follows with [facility] for a spot on his kidney. Patient has a history of lung cancer, weight loss and dysphagia. Patient has a Peg tube for feedings of two sugar tube feed and stated that goes through 6 cans daily. Patient has a history of hypotension and is on midodrine. Patient had just gotten out of bed shortly before the visit and it was noted that his blood pressure was very low. Patient's blood pressure increased after a short time. Patient just complained of increased tiredness while his blood pressure was low. Review of Systems   Constitutional: Positive for fatigue and unexpected weight change. Negative for activity change, appetite change, chills, diaphoresis and fever. HENT: Negative. Dry mouth and nosebleeds   Eyes: Negative. Respiratory: Negative. Cardiovascular: Negative. Gastrointestinal: Negative. Endocrine: Negative. Genitourinary: Negative. Musculoskeletal: Positive for gait problem. Skin: Negative. Allergic/Immunologic: Negative. Neurological: Positive for weakness. Hematological: Negative. Psychiatric/Behavioral: Positive for confusion. The patient is nervous/anxious.           PMH:     Past Medical History:   Diagnosis Date    Cataract     both eyes; corrective surgery    CHF (congestive heart failure) (HCC)     Depression     Detached retina     x 3    Dizziness     Drusen, retina 2005    Both eyes, right x 2, left once    Glaucoma     Gout     Hearing loss of both ears     Lung cancer ([facility] 1/29/10     right upper lung; neck    Macular degeneration  Macular hole 2014    left    Memory loss     Osteoarthritis     Prostate enlargement     Skin cancer 2018    left temple area    [facility])     Thyroid nodule     removed    Tremors of nervous system     Dr. Tyrel Valdez       Past Surgical History:   Procedure Laterality Date   [address],     CATARACT REMOVAL WITH IMPLANT Bilateral     COLONOSCOPY      EYE SURGERY  2006    KNEE ARTHROSCOPY Right     KNEE SURGERY Left     [facility]    [facility]    SKIN CANCER EXCISION Left     left temple area- skin cancer removal    TONSILLECTOMY      TOOTH EXTRACTION      teeth pulled    VASECTOMY         Family History   Problem Relation Age of Onset    Heart Disease Mother     Heart Attack Mother        Medications:     Current Outpatient Medications:     midodrine (PROAMATINE) 5 MG tablet, Take 10 mg by mouth 3 times daily, Disp: , Rfl:     meclizine (ANTIVERT) 12.5 MG tablet, Take 12.5 mg by mouth 3 times daily as needed, Disp: , Rfl:     levothyroxine (SYNTHROID) 75 MCG tablet, Take 75 mcg by mouth, Disp: , Rfl:     ferrous gluconate (FERGON) 240 (27 Fe) MG tablet, Take 240 mg by mouth daily , Disp: , Rfl:     vitamin C (ASCORBIC ACID) 500 MG tablet, Take 500 mg by mouth daily, Disp: , Rfl:     Cholecalciferol (VITAMIN D3) 2000 units CAPS, Take by mouth daily, Disp: , Rfl:     docusate sodium (RA COL-RITE) 100 MG capsule, Take 100 mg by mouth as needed , Disp: , Rfl:     vitamin B-12 (CYANOCOBALAMIN) 100 MCG tablet, Take 500 mcg by mouth daily, Disp: , Rfl:     Multiple Vitamins-Minerals (THERAPEUTIC MULTIVITAMIN-MINERALS) tablet, Take 1 tablet by mouth daily.  Last dose 2014, Disp: , Rfl:     Allergies:   No Known Allergies    Social History:     Social History     Tobacco Use    Smoking status: Former Smoker     Years: 47.00     Types: Cigarettes     Quit date: 2007     Years since quittin.5    Smokeless tobacco: Never Used   Substance Use Topics    Alcohol use: No    Drug use: No       Patient lives at home. Physical Exam:     Vitals:    03/18/21 1100 03/18/21 1115   BP: (!) 60/40 90/60   Pulse: 60    Resp: 16    Temp: 95.8 °F (35.4 °C)    SpO2: 96%        Exam:  Physical Exam  Vitals signs and nursing note reviewed. Constitutional:       General: He is not in acute distress. Appearance: Normal appearance. He is normal weight. He is not ill-appearing, toxic-appearing or diaphoretic. HENT:      Head: Normocephalic. Right Ear: Tympanic membrane, ear canal and external ear normal. There is no impacted cerumen. Left Ear: Tympanic membrane, ear canal and external ear normal. There is no impacted cerumen. Nose: Nose normal. No congestion or rhinorrhea. Mouth/Throat:      Mouth: Mucous membranes are moist.      Pharynx: Oropharynx is clear. No oropharyngeal exudate or posterior oropharyngeal erythema. Eyes:      General: No scleral icterus. Right eye: No discharge. Left eye: No discharge. Extraocular Movements: Extraocular movements intact. Conjunctiva/sclera: Conjunctivae normal.      Pupils: Pupils are equal, round, and reactive to light. Neck:      Musculoskeletal: Normal range of motion and neck supple. No neck rigidity or muscular tenderness. Vascular: No carotid bruit. Cardiovascular:      Rate and Rhythm: Normal rate and regular rhythm. Pulses: Normal pulses. Heart sounds: Normal heart sounds. No murmur. No friction rub. No gallop. Pulmonary:      Effort: Pulmonary effort is normal. No respiratory distress. Breath sounds: No stridor. Rales present. No wheezing or rhonchi. Comments: Rales left posterior fields  Patient uses 2 liters per minute of oxygen at night. Chest:      Chest wall: No tenderness. Abdominal:      General: Abdomen is flat. Bowel sounds are normal. There is no distension. Palpations: Abdomen is soft.  There is no mass. Tenderness: There is no abdominal tenderness. There is no right CVA tenderness, left CVA tenderness, guarding or rebound. Hernia: No hernia is present. Musculoskeletal: Normal range of motion. General: No swelling, tenderness, deformity or signs of injury. Right lower leg: No edema. Left lower leg: No edema. Lymphadenopathy:      Cervical: No cervical adenopathy. Skin:     General: Skin is warm and dry. Capillary Refill: Capillary refill takes less than 2 seconds. Coloration: Skin is not jaundiced or pale. Findings: No bruising, erythema, lesion or rash. Comments: Venous insufficiency to bilateral feet and ankles. Neurological:      Mental Status: He is alert and oriented to person, place, and time. Cranial Nerves: No cranial nerve deficit. Sensory: No sensory deficit. Motor: Weakness present. Coordination: Coordination normal.      Gait: Gait abnormal.      Comments: forgetful   Psychiatric:         Mood and Affect: Mood normal.         Behavior: Behavior normal.       BP 90/60   Pulse 60   Temp 95.8 °F (35.4 °C)   Resp 16   SpO2 96%       Testing:     Upcoming labs ordered      Medical Decision Making:     Low to moderate      Clinical Impression:   Thang Camargo was seen today for fatigue and epistaxis. Diagnoses and all orders for this visit:    Hypotension, unspecified hypotension type  -     CBC Auto Differential; Future  -     Comprehensive Metabolic Panel; Future  -     Lipid Panel; Future  -     TSH without Reflex; Future  -     T4, Free; Future    Fatigue, unspecified type  -     CBC Auto Differential; Future  -     Comprehensive Metabolic Panel; Future  -     Lipid Panel; Future  -     TSH without Reflex; Future  -     T4, Free; Future    Poor nutrition  -     Lipid Panel; Future  -     TSH without Reflex;  Future  -     T4, Free; Future    History of lung cancer    Dizziness    Cerebrovascular disease  -     CBC Auto Differential; Future  -     Comprehensive Metabolic Panel; Future  -     Lipid Panel; Future    Cerebral atherosclerosis  -     Lipid Panel; Future    Vitamin D deficiency    Reviewed medication, allergies and past medical history. Continue medication and plan of care. Advised patient to continue to monitor his blood pressure and to contact office with any readings below 90, especially if accompanied with chest pain, shortness of breath, dizziness or sweating. Patient advised to use [place] saline gel spray for nosebleeds. Upcoming labs ordered. The patient is to call for any concerns or return if any of the signs or symptoms worsen. The patient is to follow-up with PCP in 30 days for repeat evaluation repeat assessment or go directly to the emergency department if symptoms worsen. Patient expressed understanding of above recommendations.     SIGNATURE: Delaney Snellen, APRN - CNP

## 2021-05-06 PROBLEM — Z91.81 HISTORY OF FALL: Status: ACTIVE | Noted: 2021-01-01

## 2021-05-07 NOTE — PROGRESS NOTES
21  Marbin Medina : 1940 Sex: male  Age 78 y.o. Subjective:  Chief Complaint   Patient presents with    Dizziness         Patient seen at the [facility] as a follow up. Patient has a chief complaint of dizziness. Patient stated that he self checks his blood pressure readings and at times the blood pressures fluctuate high to low. Patient stated that he is scheduled for a swallow evaluation on Friday this week. Patient recently was evaluated by Dr Christopher Baltazar for skin lesions and had a lesion removed from his forehead. The lesion to his forehead has been draining a bloody clear fluid. Patient denies recent chest pain, shortness of breath or diaphoresis. Review of Systems   Constitutional: Negative. Negative for activity change, appetite change, chills, diaphoresis, fatigue, fever and unexpected weight change. HENT: Negative. Eyes: Negative. Respiratory: Negative. Cardiovascular: Negative. Gastrointestinal: Negative. Has peg tube for all feedings. Endocrine: Negative. Genitourinary: Negative. Musculoskeletal: Negative. Skin: Positive for wound. Wound from recent skin lesion removal to forehead. Allergic/Immunologic: Negative. Neurological: Positive for dizziness. Hematological: Negative. Psychiatric/Behavioral: Negative.           PMH:     Past Medical History:   Diagnosis Date    Cataract     both eyes; corrective surgery    CHF (congestive heart failure) ([address]     Depression     Detached retina     x 3    Dizziness     Drusen, retina 2005    Both eyes, right x 2, left once    Glaucoma     Gout     Hearing loss of both ears     Lung cancer ([address] 1/29/10     right upper lung; neck    Macular degeneration     Macular hole 2014    left    Memory loss     Osteoarthritis     Prostate enlargement     Skin cancer 2018    left temple area    Stroke [facility])     Thyroid nodule     removed    Tremors of nervous system Exam:  Physical Exam  Vitals signs and nursing note reviewed. Constitutional:       General: He is not in acute distress. Appearance: Normal appearance. He is normal weight. He is not ill-appearing, toxic-appearing or diaphoretic. HENT:      Head: Normocephalic. Right Ear: Tympanic membrane, ear canal and external ear normal. There is no impacted cerumen. Left Ear: Tympanic membrane, ear canal and external ear normal. There is no impacted cerumen. Nose: Nose normal. No congestion or rhinorrhea. Mouth/Throat:      Mouth: Mucous membranes are moist.      Pharynx: Oropharynx is clear. No oropharyngeal exudate or posterior oropharyngeal erythema. Eyes:      General: No scleral icterus. Right eye: No discharge. Left eye: No discharge. Extraocular Movements: Extraocular movements intact. Conjunctiva/sclera: Conjunctivae normal.      Pupils: Pupils are equal, round, and reactive to light. Neck:      Musculoskeletal: Normal range of motion and neck supple. No neck rigidity or muscular tenderness. Vascular: No carotid bruit. Cardiovascular:      Rate and Rhythm: Normal rate and regular rhythm. Pulses: Normal pulses. Heart sounds: Normal heart sounds. No murmur. No friction rub. No gallop. Pulmonary:      Effort: Pulmonary effort is normal. No respiratory distress. Breath sounds: Normal breath sounds. No stridor. No wheezing, rhonchi or rales. Chest:      Chest wall: No tenderness. Abdominal:      General: Abdomen is flat. Bowel sounds are normal. There is no distension. Palpations: Abdomen is soft. There is no mass. Tenderness: There is no abdominal tenderness. There is no right CVA tenderness, left CVA tenderness, guarding or rebound. Hernia: No hernia is present. Comments: Peg tube is intact and site is free from redness or breakdown. Musculoskeletal: Normal range of motion.          General: No swelling, tenderness, deformity or signs of injury. Right lower leg: No edema. Left lower leg: No edema. Lymphadenopathy:      Cervical: No cervical adenopathy. Skin:     General: Skin is warm and dry. Capillary Refill: Capillary refill takes less than 2 seconds. Coloration: Skin is not jaundiced or pale. Findings: No bruising, erythema, lesion or rash. Neurological:      General: No focal deficit present. Mental Status: He is alert and oriented to person, place, and time. Cranial Nerves: No cranial nerve deficit. Sensory: No sensory deficit. Motor: No weakness. Coordination: Coordination normal.      Gait: Gait normal.   Psychiatric:         Mood and Affect: Mood normal.         Behavior: Behavior normal.           Testing:     No testing was ordered      Medical Decision Making:     Low to moderate      Clinical Impression:   Aleisha Barba was seen today for dizziness. Diagnoses and all orders for this visit:    Hypotension, unspecified hypotension type    Vitamin D deficiency    Poor nutrition    Fatigue, unspecified type    Dizziness    Vertebrobasilar TIAs    Cerebral atherosclerosis    Focal seizures ([facility] [address]    Tremors of nervous system    History of lung cancer    History of fall    Reviewed medication and plan of care. Continue medication and plan of care. No medication changes recommended. Advised patient to make position changes slowly to avoid falls due to orthostatic hypotension. Follow up with DR Christopher Pagan as ordered and follow her instructions for wound care. The patient is to call for any concerns or return if any of the signs or symptoms worsen. The patient is to follow-up with PCP in the next 30 days for repeat evaluation repeat assessment or go directly to the emergency department if symptoms worsen. Patient expressed understanding of above recommendations.     SIGNATURE: ALEXIS Dominguez - KEREN

## 2021-06-29 NOTE — PROGRESS NOTES
6/29/2021    Graig Koyanagi  1940    This resident is being seen today for monthly evaluation visit. He is a resident who has long-term medical conditions including hypotension, falls, focal seizures, poor nutrition, lung cancer, vitamin D deficiency, fatigue, cerebral atherosclerosis, vertebrobasilar TIAs, Tremors, dizziness. He is a 78 y.o. male resident who is being seen today for chronic conditions. Patient currently complains of feeling tired. He does get frustrated that he is not able to eat and has to use the feeding tube for his nutrition. He does note that if he would try to eat he could get pneumonia and die. Currently at this time he denies any complaints of chest pain or palpitations. Denies any headaches, any sore throat, coughing, or shortness of breath. No nausea, vomiting,  or diarrhea. He has been having some issues with constipation and has tried Metamucil which did not help. He did take 2 doses of milk of magnesia which eventually helped. No pain in lower extremities. No fever, or chills. No recent falls or syncopal events. Objective     Vital Signs: Blood pressure 104/40 pulse 55 respirations 16 saturation is 92%    Physical examination:Skin is essentially warm and dry. HEENT unremarkable. Neck is supple. Heart regular rate and rhythm. No rubs, gallops or murmurs noted. Lungs are clear to auscultation. No evidence of rhonchi, rales, or wheezing. Abdomen is soft, supple and non-tender. Bowel sounds are noted x4 quadrants. No rigidity, guarding or rebound tenderness. Negative Campbell's, negative McBurney's, negative Epi's PEG feeding tube is in place and is asymptomatic. Lenox Crofts Extremities; no true pitting edema. Pulses are adequate. No clubbing  or no cyanosis noted. Neurologically he  is alert and oriented x3. No evidence of paralysis or paresthesias noted.     Diagnoses and all orders for this visit:    Hypotension, unspecified hypotension type  Comments:  continues episodes of BP systolic in 38V    History of fall  Comments:  no recent falls    Focal seizures (HCC)  Comments:  no recent seizures    Poor nutrition  Comments:  taking tube feeding     History of lung cancer    Vitamin D deficiency  Comments:  on vitamin D supplement    Fatigue, unspecified type    Cerebral atherosclerosis    Vertebrobasilar TIAs    Tremors of nervous system    Dizziness          Feli Dejesus, APRN - CNP        *Note was creating using voice recognition software.   The document was reviewed however grammatical errors may exist.

## 2021-08-05 PROBLEM — Z97.8 USES FEEDING TUBE: Status: ACTIVE | Noted: 2021-01-01

## 2021-08-05 PROBLEM — R13.10 DYSPHAGIA: Status: ACTIVE | Noted: 2021-01-01

## 2021-08-05 PROBLEM — T85.598A OBSTRUCTION OF FEEDING TUBE: Status: ACTIVE | Noted: 2021-01-01

## 2021-08-05 NOTE — PROGRESS NOTES
8/5/2021    Rebecca See  1940    This resident is being seen today for monthly evaluation visit. He is a resident who has long-term medical conditions including focal seizures, falls, hypotension, fatigue, vitamin D deficiency, lung cancer, dizziness, cerebral atherosclerosis, vertebrobasilar TIAs, tremors of the nervous system, dysphagia, poor nutrition, feeding tube,. He is a 78 y.o. male resident who is being seen today for chronic conditions. Patient's wife tells me that yesterday he fell into the closet and needed assistance of staff to get up. He denies any pain or injury. Reports he just lost his balance. Does ambulate utilizing a wheeled walker. .  Currently at this time he denies any complaints of chest pain or palpitations. Denies any headaches, any sore throat or shortness of breath. He does have episodes of coughing. It is noted that patient admits to cheating and at times drinking a little bit orally although he is not supposed to be taking anything orally. He is supposed to be using his feeding tube for all nutrition. He states when he starts to cough the only thing that will help him is to take a sip of water or hot coffee and that will help bring up mucus. He states a clear understanding that this practice could bring on a pneumonia because of his dysphagia. He states he is tolerating his tube feedings without difficulty. No nausea, vomiting, constipation or diarrhea. No pain in lower extremities. No fever, or chills. No syncopal events. Objective     Vital Signs: Blood pressure 100/40 pulse 66 respirations 18 saturation is 88 to 90%. It is noted that at the current time he does not have his oxygen on which he is supposed to be wearing. Physical examination:Skin is essentially warm and dry. HEENT unremarkable. Neck is supple. Heart regular rate and rhythm. No rubs, gallops or murmurs noted. Lungs do have some coarse breath sounds bilaterally. Do clear with coughing.   No wheezes are noted. . Abdomen is soft, supple and non-tender. Bowel sounds are noted x4 quadrants. No rigidity, guarding or rebound tenderness. Negative Campbell's, negative McBurney's, negative Epi's. PEG site is functioning without difficulty. Extremities; no true pitting edema. Pulses are adequate. No clubbing  or no cyanosis noted. Neurologically he  is alert and oriented x3. No evidence of paralysis or paresthesias noted. Ambulates with wheeled walker. Diagnoses and all orders for this visit:    Dysphagia, unspecified type  Comments:  Noncompliance with n.p.o. Focal seizures ([facility] [address]  Comments:  No seizures noted    History of fall  Comments:  Recent fall with no injury    Hypotension, unspecified hypotension type  Comments:  Blood pressures have been stable    Fatigue, unspecified type    Vitamin D deficiency    History of lung cancer    Dizziness    Cerebral atherosclerosis    Vertebrobasilar TIAs    Tremors of nervous system    Poor nutrition    Uses feeding tube    Patient's medications and labs are reviewed patient will be due for lab work in October at this time we will be continuing his current plan of care cautioned the patient on ambulation and utilizing his walker at all times. I did caution him about taking fluids by mouth with possible consequence of pneumonia. Encouraged to use nebulizer and oxygen therapy as ordered. Amy Sherren Martyr, APRN - CNP        *Note was creating using voice recognition software.   The document was reviewed however grammatical errors may exist.

## 2021-10-01 NOTE — PROGRESS NOTES
Patient is not at the [facility] at this time he ws admitted to [facility].

## 2021-10-01 NOTE — PROGRESS NOTES
Patient is not at [facility] at this time he was admitted to [facility]

## 2021-10-01 NOTE — PROGRESS NOTES
Patient not at the [facility] at this time he was admitted to [facility]